# Patient Record
Sex: MALE | Race: WHITE | NOT HISPANIC OR LATINO | Employment: FULL TIME | URBAN - METROPOLITAN AREA
[De-identification: names, ages, dates, MRNs, and addresses within clinical notes are randomized per-mention and may not be internally consistent; named-entity substitution may affect disease eponyms.]

---

## 2018-01-17 ENCOUNTER — OFFICE VISIT (OUTPATIENT)
Dept: FAMILY MEDICINE CLINIC | Facility: CLINIC | Age: 58
End: 2018-01-17

## 2018-01-17 VITALS
SYSTOLIC BLOOD PRESSURE: 132 MMHG | BODY MASS INDEX: 29.47 KG/M2 | WEIGHT: 199 LBS | OXYGEN SATURATION: 98 % | TEMPERATURE: 97.8 F | HEIGHT: 69 IN | DIASTOLIC BLOOD PRESSURE: 62 MMHG | HEART RATE: 67 BPM

## 2018-01-17 DIAGNOSIS — R68.89 FLU-LIKE SYMPTOMS: Primary | ICD-10-CM

## 2018-01-17 DIAGNOSIS — J01.00 ACUTE MAXILLARY SINUSITIS, RECURRENCE NOT SPECIFIED: ICD-10-CM

## 2018-01-17 LAB
EXPIRATION DATE: NORMAL
FLUAV AG NPH QL: NORMAL
FLUBV AG NPH QL: NORMAL
INTERNAL CONTROL: NORMAL
Lab: NORMAL

## 2018-01-17 PROCEDURE — 87804 INFLUENZA ASSAY W/OPTIC: CPT | Performed by: FAMILY MEDICINE

## 2018-01-17 PROCEDURE — 99213 OFFICE O/P EST LOW 20 MIN: CPT | Performed by: FAMILY MEDICINE

## 2018-01-17 RX ORDER — AMOXICILLIN AND CLAVULANATE POTASSIUM 875; 125 MG/1; MG/1
1 TABLET, FILM COATED ORAL 2 TIMES DAILY
Qty: 20 TABLET | Refills: 0 | Status: SHIPPED | OUTPATIENT
Start: 2018-01-17 | End: 2018-01-27

## 2018-01-17 RX ORDER — FLUTICASONE PROPIONATE 50 MCG
SPRAY, SUSPENSION (ML) NASAL
COMMUNITY
Start: 2018-01-05 | End: 2018-11-02

## 2018-01-17 NOTE — PROGRESS NOTES
"Subjective   Saad Cleary is a 57 y.o. male.     Flu Symptoms   This is a new problem. The current episode started in the past 7 days (Started on Monday). Associated symptoms include chills, congestion (ears), coughing, a fever and myalgias. Pertinent negatives include no chest pain.        The following portions of the patient's history were reviewed and updated as appropriate: allergies, current medications, past social history and problem list.    Review of Systems   Constitutional: Positive for chills and fever.   HENT: Positive for congestion (ears), postnasal drip, rhinorrhea and sinus pressure.    Respiratory: Positive for cough. Negative for shortness of breath.    Cardiovascular: Negative for chest pain.   Musculoskeletal: Positive for myalgias.       Objective   /62  Pulse 67  Temp 97.8 °F (36.6 °C)  Ht 175.3 cm (69.02\")  Wt 90.3 kg (199 lb)  SpO2 98%  BMI 29.37 kg/m2  Physical Exam   Constitutional: He appears well-developed and well-nourished.   HENT:   Right Ear: External ear normal.   Left Ear: External ear normal.   Nose: Right sinus exhibits maxillary sinus tenderness. Left sinus exhibits maxillary sinus tenderness.   Mouth/Throat: Oropharynx is clear and moist.   Cardiovascular: Normal rate and regular rhythm.    Pulmonary/Chest: Effort normal and breath sounds normal.   Nursing note and vitals reviewed.      Assessment/Plan   Problem List Items Addressed This Visit     None      Visit Diagnoses     Flu-like symptoms    -  Primary    Relevant Orders    POCT Influenza A/B    Acute maxillary sinusitis, recurrence not specified                  Drink plenty fluids.    Use OTC Afrin nasal spray. Place 3 sprays in each nostril twice a day for no more than 4 days. Follow 5 minutes after with OTC saline nasal spray.    Rx for Augmentin 875 mg twice a day #20+0.    Follow up as needed.              Scribed for Dr León Weiss by Kristy Hernandez CMA.          I, León Weiss MD, personally " performed the services described in this documentation, as scribed by Kristy Hernandez in my presence, and is both accurate and complete.

## 2018-11-02 ENCOUNTER — OFFICE VISIT (OUTPATIENT)
Dept: FAMILY MEDICINE CLINIC | Facility: CLINIC | Age: 58
End: 2018-11-02

## 2018-11-02 VITALS
WEIGHT: 196 LBS | TEMPERATURE: 97.4 F | OXYGEN SATURATION: 98 % | HEIGHT: 69 IN | DIASTOLIC BLOOD PRESSURE: 76 MMHG | HEART RATE: 70 BPM | BODY MASS INDEX: 29.03 KG/M2 | SYSTOLIC BLOOD PRESSURE: 128 MMHG

## 2018-11-02 DIAGNOSIS — J06.9 ACUTE URI: Primary | ICD-10-CM

## 2018-11-02 PROCEDURE — 99213 OFFICE O/P EST LOW 20 MIN: CPT | Performed by: FAMILY MEDICINE

## 2018-11-02 RX ORDER — AZITHROMYCIN 250 MG/1
TABLET, FILM COATED ORAL
Qty: 6 TABLET | Refills: 0 | Status: SHIPPED | OUTPATIENT
Start: 2018-11-02 | End: 2019-02-11

## 2018-11-02 NOTE — PROGRESS NOTES
Subjective   Saad Cleary is a 58 y.o. male.     History of Present Illness   Head congestion, drainage three days.   Right hand in suspension device for ruptured tendon with surgery four months ago, minimal rehab and not using hand or wrist. Took Stahist with some relief.  The following portions of the patient's history were reviewed and updated as appropriate: allergies, current medications, past family history, past medical history, past social history, past surgical history and problem list.    Review of Systems   Constitutional: Negative for chills and fever.   HENT: Positive for congestion, ear pain and sore throat.    Respiratory: Negative.    Cardiovascular: Negative.        Objective   Physical Exam   Constitutional: He appears well-developed.   HENT:   Right Ear: Tympanic membrane is injected.   Left Ear: External ear normal.   Neck: Neck supple.   Pulmonary/Chest: Breath sounds normal.   Lymphadenopathy:     He has no cervical adenopathy.   Vitals reviewed.        Assessment/Plan   Saad was seen today for uri.    Diagnoses and all orders for this visit:    Acute URI  -     azithromycin (ZITHROMAX) 250 MG tablet; Take 2 tablets the first day, then 1 tablet daily on days 2 through 5.  -     Chlorcyclizine-Pseudoephed 25-60 MG tablet; Take 1 tablet by mouth 2 (Two) Times a Day As Needed (congestion).

## 2019-02-11 ENCOUNTER — OFFICE VISIT (OUTPATIENT)
Dept: FAMILY MEDICINE CLINIC | Facility: CLINIC | Age: 59
End: 2019-02-11

## 2019-02-11 VITALS
OXYGEN SATURATION: 98 % | DIASTOLIC BLOOD PRESSURE: 80 MMHG | RESPIRATION RATE: 18 BRPM | SYSTOLIC BLOOD PRESSURE: 142 MMHG | WEIGHT: 197.25 LBS | HEART RATE: 62 BPM | HEIGHT: 69 IN | TEMPERATURE: 97.8 F | BODY MASS INDEX: 29.21 KG/M2

## 2019-02-11 DIAGNOSIS — J02.9 PHARYNGITIS, UNSPECIFIED ETIOLOGY: Primary | ICD-10-CM

## 2019-02-11 DIAGNOSIS — J02.9 SORE THROAT: ICD-10-CM

## 2019-02-11 LAB
EXPIRATION DATE: NORMAL
EXPIRATION DATE: NORMAL
FLUAV AG NPH QL: NEGATIVE
FLUBV AG NPH QL: NEGATIVE
INTERNAL CONTROL: NORMAL
INTERNAL CONTROL: NORMAL
Lab: NORMAL
Lab: NORMAL
S PYO AG THROAT QL: NEGATIVE

## 2019-02-11 PROCEDURE — 87804 INFLUENZA ASSAY W/OPTIC: CPT | Performed by: NURSE PRACTITIONER

## 2019-02-11 PROCEDURE — 87880 STREP A ASSAY W/OPTIC: CPT | Performed by: NURSE PRACTITIONER

## 2019-02-11 PROCEDURE — 99213 OFFICE O/P EST LOW 20 MIN: CPT | Performed by: NURSE PRACTITIONER

## 2019-02-11 RX ORDER — AMOXICILLIN AND CLAVULANATE POTASSIUM 875; 125 MG/1; MG/1
1 TABLET, FILM COATED ORAL 2 TIMES DAILY
Qty: 20 TABLET | Refills: 0 | Status: SHIPPED | OUTPATIENT
Start: 2019-02-11 | End: 2022-08-10

## 2019-02-11 NOTE — PROGRESS NOTES
Subjective   Saad Cleary is a 58 y.o. male.     History of Present Illness Complains of 2 days of nasal congestion, headache, sore throat. No cough, fever, GI distress. No chest pain, sob, wheezing, nausea or visual changes. No treatment.  He did not have a flu vacc. No myalgia.   Daughter with same symptoms.    The following portions of the patient's history were reviewed and updated as appropriate: allergies, current medications, past family history, past medical history, past social history, past surgical history and problem list.    Review of Systems   Constitutional: Negative for appetite change, fever and unexpected weight loss.   HENT: Positive for sore throat. Negative for nosebleeds and trouble swallowing.    Eyes: Negative for visual disturbance.   Respiratory: Negative for cough, shortness of breath and wheezing.    Cardiovascular: Negative for chest pain, palpitations and leg swelling.   Gastrointestinal: Negative for abdominal pain, blood in stool, constipation, diarrhea, nausea and vomiting.   Endocrine: Negative for polydipsia, polyphagia and polyuria.   Genitourinary: Negative for urinary incontinence, dysuria, frequency and hematuria.   Musculoskeletal: Negative for arthralgias, gait problem, joint swelling and myalgias.   Skin: Negative for rash.   Neurological: Positive for headache. Negative for dizziness, seizures, syncope and numbness.   Hematological: Negative for adenopathy. Does not bruise/bleed easily.   Psychiatric/Behavioral: Negative for sleep disturbance and depressed mood. The patient is not nervous/anxious.        Objective   Physical Exam   Constitutional: He is oriented to person, place, and time. He appears well-developed and well-nourished. No distress.   HENT:   Head: Normocephalic and atraumatic.   Right Ear: Tympanic membrane and external ear normal.   Left Ear: Tympanic membrane and external ear normal.   Nose: Nose normal.   Mouth/Throat: Oropharyngeal exudate, posterior  oropharyngeal edema and posterior oropharyngeal erythema present.   Eyes: Conjunctivae are normal. Pupils are equal, round, and reactive to light. Right eye exhibits no discharge. Left eye exhibits no discharge. No scleral icterus.   Neck: Neck supple. No tracheal deviation present. No thyromegaly present.   Cardiovascular: Normal rate, regular rhythm and normal heart sounds. Exam reveals no gallop and no friction rub.   No murmur heard.  Pulmonary/Chest: Effort normal and breath sounds normal. No respiratory distress. He has no wheezes.   Abdominal: Soft. Bowel sounds are normal. He exhibits no distension and no mass. There is no tenderness.   Musculoskeletal: He exhibits no edema or deformity.   Lymphadenopathy:     He has no cervical adenopathy.   Neurological: He is alert and oriented to person, place, and time. Coordination normal.   Skin: Skin is warm and dry. Capillary refill takes less than 2 seconds. No rash noted. No erythema.   Psychiatric: He has a normal mood and affect. His speech is normal and behavior is normal. Judgment and thought content normal.   Nursing note and vitals reviewed.        Assessment/Plan   Saad was seen today for headache and sore throat.    Diagnoses and all orders for this visit:    Pharyngitis, unspecified etiology  -     amoxicillin-clavulanate (AUGMENTIN) 875-125 MG per tablet; Take 1 tablet by mouth 2 (Two) Times a Day.    Sore throat  -     POC Influenza A / B  -     POC Rapid Strep A      Finish all of meds. Soft foods. New toothbrush at the end of treatment. Follow up symptoms persist or worsen.

## 2021-07-06 ENCOUNTER — APPOINTMENT (OUTPATIENT)
Dept: GENERAL RADIOLOGY | Facility: HOSPITAL | Age: 61
End: 2021-07-06

## 2021-07-06 ENCOUNTER — APPOINTMENT (OUTPATIENT)
Dept: CT IMAGING | Facility: HOSPITAL | Age: 61
End: 2021-07-06

## 2021-07-06 ENCOUNTER — HOSPITAL ENCOUNTER (EMERGENCY)
Facility: HOSPITAL | Age: 61
Discharge: HOME | End: 2021-07-07
Attending: EMERGENCY MEDICINE | Admitting: EMERGENCY MEDICINE

## 2021-07-06 VITALS
HEART RATE: 64 BPM | SYSTOLIC BLOOD PRESSURE: 183 MMHG | WEIGHT: 190 LBS | OXYGEN SATURATION: 95 % | TEMPERATURE: 97.7 F | HEIGHT: 69 IN | BODY MASS INDEX: 28.14 KG/M2 | RESPIRATION RATE: 16 BRPM | DIASTOLIC BLOOD PRESSURE: 100 MMHG

## 2021-07-06 DIAGNOSIS — S42.001A CLOSED DISPLACED FRACTURE OF RIGHT CLAVICLE, UNSPECIFIED PART OF CLAVICLE, INITIAL ENCOUNTER: ICD-10-CM

## 2021-07-06 DIAGNOSIS — S20.229A CONTUSION OF BACK, UNSPECIFIED LATERALITY, INITIAL ENCOUNTER: ICD-10-CM

## 2021-07-06 DIAGNOSIS — S09.90XA INJURY OF HEAD, INITIAL ENCOUNTER: Primary | ICD-10-CM

## 2021-07-06 DIAGNOSIS — S60.032A CONTUSION OF LEFT MIDDLE FINGER WITHOUT DAMAGE TO NAIL, INITIAL ENCOUNTER: ICD-10-CM

## 2021-07-06 DIAGNOSIS — M50.30 DDD (DEGENERATIVE DISC DISEASE), CERVICAL: ICD-10-CM

## 2021-07-06 DIAGNOSIS — S22.31XA FRACTURE OF ONE RIB, RIGHT SIDE, INITIAL ENCOUNTER FOR CLOSED FRACTURE: ICD-10-CM

## 2021-07-06 LAB
HOLD SPECIMEN: NORMAL
HOLD SPECIMEN: NORMAL
WHOLE BLOOD HOLD SPECIMEN: NORMAL

## 2021-07-06 PROCEDURE — 73070 X-RAY EXAM OF ELBOW: CPT

## 2021-07-06 PROCEDURE — 73140 X-RAY EXAM OF FINGER(S): CPT

## 2021-07-06 PROCEDURE — 73030 X-RAY EXAM OF SHOULDER: CPT

## 2021-07-06 PROCEDURE — 72128 CT CHEST SPINE W/O DYE: CPT

## 2021-07-06 PROCEDURE — 99284 EMERGENCY DEPT VISIT MOD MDM: CPT

## 2021-07-06 PROCEDURE — 71250 CT THORAX DX C-: CPT

## 2021-07-06 PROCEDURE — 72125 CT NECK SPINE W/O DYE: CPT

## 2021-07-06 PROCEDURE — 70450 CT HEAD/BRAIN W/O DYE: CPT

## 2021-07-06 RX ORDER — SODIUM CHLORIDE 0.9 % (FLUSH) 0.9 %
10 SYRINGE (ML) INJECTION AS NEEDED
Status: DISCONTINUED | OUTPATIENT
Start: 2021-07-06 | End: 2021-07-07 | Stop reason: HOSPADM

## 2021-07-06 RX ORDER — HYDROCODONE BITARTRATE AND ACETAMINOPHEN 5; 325 MG/1; MG/1
1 TABLET ORAL EVERY 6 HOURS PRN
Qty: 12 TABLET | Refills: 0 | Status: SHIPPED | OUTPATIENT
Start: 2021-07-06 | End: 2022-08-10

## 2021-07-06 RX ORDER — HYDROCODONE BITARTRATE AND ACETAMINOPHEN 5; 325 MG/1; MG/1
1 TABLET ORAL ONCE
Status: COMPLETED | OUTPATIENT
Start: 2021-07-06 | End: 2021-07-06

## 2021-07-06 RX ADMIN — HYDROCODONE BITARTRATE AND ACETAMINOPHEN 1 TABLET: 5; 325 TABLET ORAL at 23:24

## 2021-07-07 ENCOUNTER — NURSE TRIAGE (OUTPATIENT)
Dept: CALL CENTER | Facility: HOSPITAL | Age: 61
End: 2021-07-07

## 2021-07-07 LAB — HOLD SPECIMEN: NORMAL

## 2021-07-07 PROCEDURE — 63710000001 ONDANSETRON ODT 4 MG TABLET DISPERSIBLE: Performed by: NURSE PRACTITIONER

## 2021-07-07 RX ORDER — ONDANSETRON 4 MG/1
4 TABLET, ORALLY DISINTEGRATING ORAL EVERY 4 HOURS
Qty: 12 TABLET | Refills: 0 | Status: SHIPPED | OUTPATIENT
Start: 2021-07-07 | End: 2022-08-10

## 2021-07-07 RX ORDER — ONDANSETRON 4 MG/1
4 TABLET, ORALLY DISINTEGRATING ORAL ONCE
Status: COMPLETED | OUTPATIENT
Start: 2021-07-07 | End: 2021-07-07

## 2021-07-07 RX ADMIN — ONDANSETRON 4 MG: 4 TABLET, ORALLY DISINTEGRATING ORAL at 00:14

## 2021-07-07 NOTE — TELEPHONE ENCOUNTER
Was seen in ER last night and was prescribed Norco and Zofran and on AVS said continue taking augmentin, he is not taking it and has not since 2019, told her was on med list, if was old medication to disregard and ask Ortho if needs to be on an antibiotic,  today when pt. wll be seen.     Reason for Disposition  • Caller has medicine question only, adult not sick, AND triager answers question    Additional Information  • Negative: [1] Intentional drug overdose AND [2] suicidal thoughts or ideas  • Negative: Drug overdose and triager unable to answer question  • Negative: Caller requesting information unrelated to medicine  • Negative: Caller requesting information about COVID-19 Vaccine  • Negative: Caller requesting information about Emergency Contraception  • Negative: Caller requesting information about Combined Birth Control Pills  • Negative: Caller requesting information about Progestin Birth Control Pills  • Negative: Caller requesting information about Post-Op pain or medicines  • Negative: Caller requesting a prescription antibiotic (such as Penicillin) for Strep throat and has a positive culture result  • Negative: Caller requesting a prescription anti-viral med (such as Tamiflu) and has influenza (flu)  symptoms  • Negative: Immunization reaction suspected  • Negative: Rash while taking a medicine or within 3 days of stopping it  • Negative: [1] Asthma and [2] having symptoms of asthma (cough, wheezing, etc.)  • Negative: [1] Symptom of illness (e.g., headache, abdominal pain, earache, vomiting) AND [2] more than mild  • Negative: Breastfeeding questions about mother's medicines and diet  • Negative: MORE THAN A DOUBLE DOSE of a prescription or over-the-counter (OTC) drug  • Negative: [1] DOUBLE DOSE (an extra dose or lesser amount) of prescription drug AND [2] any symptoms (e.g., dizziness, nausea, pain, sleepiness)  • Negative: [1] DOUBLE DOSE (an extra dose or lesser amount) of over-the-counter (OTC)  drug AND [2] any symptoms (e.g., dizziness, nausea, pain, sleepiness)  • Negative: Took another person's prescription drug  • Negative: [1] DOUBLE DOSE (an extra dose or lesser amount) of prescription drug AND [2] NO symptoms (Exception: a double dose of antibiotics)  • Negative: Diabetes drug error or overdose (e.g., took wrong type of insulin or took extra dose)  • Negative: [1] Prescription refill request for ESSENTIAL medicine (i.e., likelihood of harm to patient if not taken) AND [2] triager unable to refill per department policy  • Negative: [1] Prescription not at pharmacy AND [2] was prescribed by PCP recently (Exception: triager has access to EMR and prescription is recorded there. Go to Home Care and confirm for pharmacy.)  • Negative: [1] Pharmacy calling with prescription question AND [2] triager unable to answer question  • Negative: [1] Caller has URGENT medicine question about med that PCP or specialist prescribed AND [2] triager unable to answer question  • Negative: Medicine patch causing local rash or itching  • Negative: [1] Caller has medicine question about med NOT prescribed by PCP AND [2] triager unable to answer question (e.g., compatibility with other med, storage)  • Negative: Prescription request for new medicine (not a refill)  • Negative: Prescription refill request for a CONTROLLED substance (e.g., narcotics, ADHD medicines)  • Negative: [1] Prescription refill request for NON-ESSENTIAL medicine (i.e., no harm to patient if med not taken) AND [2] triager unable to refill per department policy  • Negative: [1] Caller has NON-URGENT medicine question about med that PCP prescribed AND [2] triager unable to answer question  • Negative: Caller wants to use a complementary or alternative medicine  • Negative: [1] Prescription prescribed recently is not at pharmacy AND [2] triager has access to patient's EMR AND [3] prescription is recorded in the EMR  • Negative: [1] DOUBLE DOSE (an extra  "dose or lesser amount) of over-the-counter (OTC) drug AND [2] NO symptoms  • Negative: [1] DOUBLE DOSE (an extra dose or lesser amount) of antibiotic drug AND [2] NO symptoms  • Negative: Caller has medicine question, adult has minor symptoms, caller declines triage, AND triager answers question  • Negative: Caller requesting information about medicine during pregnancy; adult is not ill AND triager answers question  • Negative: Caller requesting information about medicine use with breastfeeding; neither adult nor infant is ill, triager answers question  • Negative: [1] Caller requesting a prescription renewal (no refills left), no triage required, AND [2] triager able to renew prescription per department policy  • Negative: Patient has refills remaining on their prescription  • Negative: Pharmacy calling with prescription question and triager answers question    Answer Assessment - Initial Assessment Questions  1. NAME of MEDICATION: \"What medicine are you calling about?\"      Augmentin  2. QUESTION: \"What is your question?\" (e.g., medication refill, side effect)      He is not on this says continued on AVS  3. PRESCRIBING HCP: \"Who prescribed it?\" Reason: if prescribed by specialist, call should be referred to that group.      Old prescription  4. SYMPTOMS: \"Do you have any symptoms?\"      no  5. SEVERITY: If symptoms are present, ask \"Are they mild, moderate or severe?\"      no  6. PREGNANCY:  \"Is there any chance that you are pregnant?\" \"When was your last menstrual period?\"      no    Protocols used: MEDICATION QUESTION CALL-ADULT-      "

## 2022-08-10 ENCOUNTER — OFFICE VISIT (OUTPATIENT)
Dept: FAMILY MEDICINE CLINIC | Facility: CLINIC | Age: 62
End: 2022-08-10

## 2022-08-10 VITALS
SYSTOLIC BLOOD PRESSURE: 124 MMHG | BODY MASS INDEX: 29.03 KG/M2 | TEMPERATURE: 97.1 F | WEIGHT: 196 LBS | HEIGHT: 69 IN | OXYGEN SATURATION: 98 % | HEART RATE: 72 BPM | DIASTOLIC BLOOD PRESSURE: 80 MMHG

## 2022-08-10 DIAGNOSIS — U07.1 COVID-19 VIRUS INFECTION: Primary | ICD-10-CM

## 2022-08-10 LAB
EXPIRATION DATE: ABNORMAL
EXPIRATION DATE: NORMAL
FLUAV AG UPPER RESP QL IA.RAPID: NOT DETECTED
FLUBV AG UPPER RESP QL IA.RAPID: NOT DETECTED
INTERNAL CONTROL: ABNORMAL
INTERNAL CONTROL: NORMAL
Lab: ABNORMAL
Lab: NORMAL
S PYO AG THROAT QL: NEGATIVE
SARS-COV-2 AG UPPER RESP QL IA.RAPID: DETECTED

## 2022-08-10 PROCEDURE — 99203 OFFICE O/P NEW LOW 30 MIN: CPT | Performed by: PHYSICIAN ASSISTANT

## 2022-08-10 PROCEDURE — 87428 SARSCOV & INF VIR A&B AG IA: CPT | Performed by: PHYSICIAN ASSISTANT

## 2022-08-10 PROCEDURE — 87880 STREP A ASSAY W/OPTIC: CPT | Performed by: PHYSICIAN ASSISTANT

## 2022-08-10 RX ORDER — BROMPHENIRAMINE MALEATE, PSEUDOEPHEDRINE HYDROCHLORIDE, AND DEXTROMETHORPHAN HYDROBROMIDE 2; 30; 10 MG/5ML; MG/5ML; MG/5ML
5 SYRUP ORAL 4 TIMES DAILY PRN
Qty: 118 ML | Refills: 0 | Status: SHIPPED | OUTPATIENT
Start: 2022-08-10

## 2022-08-10 NOTE — PATIENT INSTRUCTIONS
-Isolate for 5 days from the onset of your symptoms. You may end your isolation after 5 days as long as you have been without a fever for more than 24 hours without the use of fever reducing medications such as Tylenol and ibuprofen and symptoms are improving.  If symptoms have not improved or you still have a fever after 5 days, you must isolate for 10 days from the onset of your symptoms.  -If you are short of breath, I recommend reassessment. If this is severe, please go to the emergency room.  -I recommend obtaining a pulse oximeter and monitoring your oxygen saturations at home.  If oxygen saturations are persistently less than 94%, I would also recommend going to the ER  -I recommend vitamin C 500 mg, zinc 50 mg, and vitamin D3 50 mcg daily

## 2022-08-10 NOTE — PROGRESS NOTES
Chief Complaint   Patient presents with   • Establish Care   • Sore Throat     X 2 days   • nasal drainage x 2 days       HPI     Saad Cleary is a pleasant 61 y.o. male with a PMH of colon polyps who presents for initial visit.     His previous PCP retired around 1 year ago.   Patient c/o sore throat starting 3 days ago. He has more drainage, cough, headache, body aches, and chest congestion now. He may have had a fever one day. Denies current fever, loss of taste/smell, shortness of breath. He did a PCR COVID test yesterday at his employer's clinic ([facility]) but this is still pending. He tool Tylenol about 1 hour ago.      Past Medical History:   Diagnosis Date   • Acute pharyngitis    • Acute upper respiratory infection    • Colon polyps    • Testicular mass        Past Surgical History:   Procedure Laterality Date   • ELBOW ARTHROSCOPY     • FOOT TENDON SURGERY Left 07/2021   • HAND TENDON SURGERY  07/2020   • SHOULDER SURGERY         Family History   Problem Relation Age of Onset   • Diabetes Mother    • Cancer Mother    • Breast cancer Mother    • Heart attack Mother    • Lung cancer Father    • Breast cancer Sister        Social History     Socioeconomic History   • Marital status:    Tobacco Use   • Smoking status: Never Smoker   • Smokeless tobacco: Never Used   Vaping Use   • Vaping Use: Never used   Substance and Sexual Activity   • Alcohol use: No   • Drug use: No   • Sexual activity: Defer       Allergies   Allergen Reactions   • Codeine        ROS    Review of Systems   Constitutional: Negative for chills and fever.   HENT: Positive for congestion and sore throat.    Respiratory: Positive for cough. Negative for shortness of breath and wheezing.    Cardiovascular: Negative for chest pain.   Musculoskeletal: Positive for myalgias.   Neurological: Positive for headache.       Vitals:    08/10/22 0829   BP: 124/80   Pulse: 72   Temp: 97.1 °F (36.2 °C)   SpO2: 98%     Body mass index is 28.94  kg/m².      Current Outpatient Medications:   •  brompheniramine-pseudoephedrine-DM 30-2-10 MG/5ML syrup, Take 5 mL by mouth 4 (Four) Times a Day As Needed for Congestion or Cough., Disp: 118 mL, Rfl: 0    PE    Physical Exam  Vitals reviewed.   Constitutional:       General: He is not in acute distress.     Appearance: He is well-developed.   HENT:      Head: Normocephalic and atraumatic.   Eyes:      Conjunctiva/sclera: Conjunctivae normal.   Cardiovascular:      Rate and Rhythm: Normal rate and regular rhythm.      Heart sounds: Normal heart sounds. No murmur heard.  Pulmonary:      Effort: Pulmonary effort is normal.      Breath sounds: Normal breath sounds.   Musculoskeletal:      Cervical back: Normal range of motion.   Skin:     General: Skin is warm and dry.   Neurological:      Mental Status: He is alert.      Gait: Gait normal.   Psychiatric:         Speech: Speech normal.         Behavior: Behavior normal.          A/P    Problem List Items Addressed This Visit    None     Visit Diagnoses     COVID-19 virus infection    -  Primary  -COVID test is positive today. Rapid strep and influenza tests are negative  -Currently mild symptoms, afebrile, lungs CTA  -Discussed symptomatic and supportive treatment. Patient declines Paxlovid prescription after discussion regarding risks and benefits  -Rx cough syrup to help with postnasal drainage, congestion, sore throat, and cough  -We reviewed isolation recommendations and contingencies for worsening symptoms  -Return to clinic in 6 months for physical or sooner if needed    Relevant Orders    POCT rapid strep A (Completed)    POCT SARS-CoV-2 Antigen MADHU (Completed)          Plan of care was reviewed with patient at the conclusion of today's visit. Education was provided regarding diagnoses, management, prescribed or recommended OTC products, and the importance of compliance with follow-up appointments. The patient was counseled regarding the risks, benefits, and  possible side-effects of treatment. I advised the patient to keep me informed of any acute changes in their status including new, worsening, or persistent symptoms. Patient expresses understanding and agreement with the management plan.        AUNG Sibley

## 2023-07-26 NOTE — PROGRESS NOTES
"[facility]    Chief Complaint  Abnormal ECG and Establish Care    Subjective    History of Present Illness {CC  Problem List  Visit  Diagnosis   Encounters  Notes  Medications  Labs  Result Review Imaging  Media :23}     Saad Cleary is a 62 y.o. male with HTN who presents today for evaluation of abnormal EKG at the request of [facility].    Patient had recent workplace physical and had an EKG which showed a first-degree AV block, approximately 294 ms. Works at [employer]. He is asymptomatic. He exercises regularly, does the elliptical or stationary bike for an hour. Used to run prior to his knee pain. Notes some mild lower extremity edema, but improved after he was started on lisinopril. He denies shortness of breath. Rare orthostatic lightheadedness since starting lisinopril. No syncope       Cardiac risks: HTN, age    Objective     Vital Signs:   Vitals:    07/27/23 1531 07/27/23 1534 07/27/23 1535   BP: 128/73 132/82 119/73   BP Location: Right arm Left arm Left arm   Patient Position: Sitting Standing Sitting   Cuff Size: Adult Adult Adult   Pulse: 59 59 57   Resp:   16   Temp:   97.8 °F (36.6 °C)   TempSrc:   Temporal   SpO2: 96% 95% 96%   Weight:   89.6 kg (197 lb 8 oz)   Height:   175.3 cm (69\")     Body mass index is 29.17 kg/m².  Physical Exam  Vitals reviewed.   Constitutional:       Appearance: Normal appearance.   HENT:      Head: Normocephalic.   Neck:      Vascular: No carotid bruit.   Cardiovascular:      Rate and Rhythm: Regular rhythm. Bradycardia present.      Pulses: Normal pulses.      Heart sounds: Normal heart sounds, S1 normal and S2 normal. No murmur heard.  Pulmonary:      Effort: Pulmonary effort is normal. No respiratory distress.      Breath sounds: Normal breath sounds.   Chest:      Chest wall: No tenderness.   Abdominal:      General: Abdomen is flat.      Palpations: Abdomen is soft.   Musculoskeletal:      Cervical back: " Neck supple.      Right lower le+ No edema.      Left lower le+ No edema.   Skin:     General: Skin is warm and dry.   Neurological:      General: No focal deficit present.      Mental Status: He is alert and oriented to person, place, and time. Mental status is at baseline.   Psychiatric:         Mood and Affect: Mood normal.         Behavior: Behavior normal.         Thought Content: Thought content normal.            Result Review  Data Reviewed:{ Labs  Result Review  Imaging  Med Tab  Media :23}   Reviewed records from [facility]  SCANNED EKG (2023)          Assessment and Plan {CC Problem List  Visit Diagnosis  ROS  Review (Popup)  Health Maintenance  Quality  BestPractice  Medications  SmartSets  SnapShot Encounters  Media :23}   1. Abnormal EKG  - Voltage criteria for possible LVH. Will do echo  - ECG 12 Lead; Future  - Adult Transthoracic Echo Complete W/ Cont if Necessary Per Protocol; Future  - TSH Rfx On Abnormal To Free T4; Future    2. First degree AV block  - Asymptomatic  - Adult Transthoracic Echo Complete W/ Cont if Necessary Per Protocol; Future    3. Essential hypertension  - Newly diagnosed. Reviewed home BPs and most SBPs less than 120  -Continue lisinopril.  He has not had repeat labs since he started lisinopril.  We will check today  - Adult Transthoracic Echo Complete W/ Cont if Necessary Per Protocol; Future  - Basic Metabolic Panel; Future  - TSH Rfx On Abnormal To Free T4; Future    4. Bilateral lower extremity edema  - Dependent edema  - Adult Transthoracic Echo Complete W/ Cont if Necessary Per Protocol; Future  - Basic Metabolic Panel; Future  - TSH Rfx On Abnormal To Free T4; Future        Follow Up {Instructions Charge Capture  Follow-up Communications :23}   Return if symptoms worsen or fail to improve.    Patient was given instructions and counseling regarding his condition or for health maintenance advice. Please see specific information  pulled into the AVS if appropriate.  Advised to call the [facility] with any questions, concerns, or worsening symptoms.

## 2023-07-27 ENCOUNTER — OFFICE VISIT (OUTPATIENT)
Dept: CARDIOLOGY | Facility: HOSPITAL | Age: 63
End: 2023-07-27
Payer: COMMERCIAL

## 2023-07-27 ENCOUNTER — HOSPITAL ENCOUNTER (OUTPATIENT)
Dept: CARDIOLOGY | Facility: HOSPITAL | Age: 63
Discharge: HOME | End: 2023-07-27
Payer: COMMERCIAL

## 2023-07-27 ENCOUNTER — LAB (OUTPATIENT)
Dept: LAB | Facility: HOSPITAL | Age: 63
End: 2023-07-27
Payer: COMMERCIAL

## 2023-07-27 VITALS
BODY MASS INDEX: 29.25 KG/M2 | WEIGHT: 197.5 LBS | HEART RATE: 57 BPM | RESPIRATION RATE: 16 BRPM | SYSTOLIC BLOOD PRESSURE: 119 MMHG | TEMPERATURE: 97.8 F | DIASTOLIC BLOOD PRESSURE: 73 MMHG | HEIGHT: 69 IN | OXYGEN SATURATION: 96 %

## 2023-07-27 DIAGNOSIS — R94.31 ABNORMAL EKG: Primary | ICD-10-CM

## 2023-07-27 DIAGNOSIS — R60.0 BILATERAL LOWER EXTREMITY EDEMA: ICD-10-CM

## 2023-07-27 DIAGNOSIS — R94.31 ABNORMAL EKG: ICD-10-CM

## 2023-07-27 DIAGNOSIS — I10 ESSENTIAL HYPERTENSION: ICD-10-CM

## 2023-07-27 DIAGNOSIS — I44.0 FIRST DEGREE AV BLOCK: ICD-10-CM

## 2023-07-27 LAB
ANION GAP SERPL CALCULATED.3IONS-SCNC: 13 MMOL/L (ref 5–15)
BUN SERPL-MCNC: 19 MG/DL (ref 8–23)
BUN/CREAT SERPL: 17.9 (ref 7–25)
CALCIUM SPEC-SCNC: 9.2 MG/DL (ref 8.6–10.5)
CHLORIDE SERPL-SCNC: 105 MMOL/L (ref 98–107)
CO2 SERPL-SCNC: 24 MMOL/L (ref 22–29)
CREAT SERPL-MCNC: 1.06 MG/DL (ref 0.76–1.27)
EGFRCR SERPLBLD CKD-EPI 2021: 78.4 ML/MIN/1.73
GLUCOSE SERPL-MCNC: 96 MG/DL (ref 65–99)
POTASSIUM SERPL-SCNC: 4.5 MMOL/L (ref 3.5–5.2)
SODIUM SERPL-SCNC: 142 MMOL/L (ref 136–145)
TSH SERPL DL<=0.05 MIU/L-ACNC: 1.76 UIU/ML (ref 0.27–4.2)

## 2023-07-27 PROCEDURE — 80048 BASIC METABOLIC PNL TOTAL CA: CPT

## 2023-07-27 PROCEDURE — 93005 ELECTROCARDIOGRAM TRACING: CPT | Performed by: NURSE PRACTITIONER

## 2023-07-27 PROCEDURE — 84443 ASSAY THYROID STIM HORMONE: CPT

## 2023-07-27 PROCEDURE — 36415 COLL VENOUS BLD VENIPUNCTURE: CPT

## 2023-07-27 RX ORDER — LISINOPRIL 20 MG/1
20 TABLET ORAL DAILY
COMMUNITY
Start: 2023-06-16 | End: 2024-06-15

## 2023-07-28 LAB
QT INTERVAL: 444 MS
QTC INTERVAL: 424 MS

## 2023-08-04 LAB
QT INTERVAL: 444 MS
QTC INTERVAL: 424 MS

## 2023-09-05 ENCOUNTER — HOSPITAL ENCOUNTER (OUTPATIENT)
Dept: CARDIOLOGY | Facility: HOSPITAL | Age: 63
Discharge: HOME | End: 2023-09-05
Admitting: NURSE PRACTITIONER
Payer: COMMERCIAL

## 2023-09-05 VITALS
WEIGHT: 197 LBS | DIASTOLIC BLOOD PRESSURE: 84 MMHG | HEIGHT: 69 IN | BODY MASS INDEX: 29.18 KG/M2 | SYSTOLIC BLOOD PRESSURE: 136 MMHG

## 2023-09-05 DIAGNOSIS — R94.31 ABNORMAL EKG: ICD-10-CM

## 2023-09-05 DIAGNOSIS — R60.0 BILATERAL LOWER EXTREMITY EDEMA: ICD-10-CM

## 2023-09-05 DIAGNOSIS — I44.0 FIRST DEGREE AV BLOCK: ICD-10-CM

## 2023-09-05 DIAGNOSIS — I10 ESSENTIAL HYPERTENSION: ICD-10-CM

## 2023-09-05 PROCEDURE — 93306 TTE W/DOPPLER COMPLETE: CPT

## 2023-09-06 ENCOUNTER — TELEPHONE (OUTPATIENT)
Dept: CARDIOLOGY | Facility: HOSPITAL | Age: 63
End: 2023-09-06
Payer: COMMERCIAL

## 2023-09-06 LAB
BH CV ECHO MEAS - AO MAX PG: 8.5 MMHG
BH CV ECHO MEAS - AO MEAN PG: 5 MMHG
BH CV ECHO MEAS - AO ROOT DIAM: 3.4 CM
BH CV ECHO MEAS - AO V2 MAX: 145.5 CM/SEC
BH CV ECHO MEAS - AO V2 VTI: 32.6 CM
BH CV ECHO MEAS - AVA(I,D): 3 CM2
BH CV ECHO MEAS - EDV(CUBED): 42.9 ML
BH CV ECHO MEAS - EDV(MOD-SP2): 131 ML
BH CV ECHO MEAS - EDV(MOD-SP4): 122 ML
BH CV ECHO MEAS - EF(MOD-BP): 61.4 %
BH CV ECHO MEAS - EF(MOD-SP2): 64.3 %
BH CV ECHO MEAS - EF(MOD-SP4): 57 %
BH CV ECHO MEAS - ESV(CUBED): 12.2 ML
BH CV ECHO MEAS - ESV(MOD-SP2): 46.8 ML
BH CV ECHO MEAS - ESV(MOD-SP4): 52.4 ML
BH CV ECHO MEAS - FS: 34.3 %
BH CV ECHO MEAS - IVS/LVPW: 1 CM
BH CV ECHO MEAS - IVSD: 1.3 CM
BH CV ECHO MEAS - LA DIMENSION: 3.7 CM
BH CV ECHO MEAS - LAT PEAK E' VEL: 10.6 CM/SEC
BH CV ECHO MEAS - LV DIASTOLIC VOL/BSA (35-75): 59.4 CM2
BH CV ECHO MEAS - LV MASS(C)D: 153.8 GRAMS
BH CV ECHO MEAS - LV MAX PG: 9 MMHG
BH CV ECHO MEAS - LV MEAN PG: 4 MMHG
BH CV ECHO MEAS - LV SYSTOLIC VOL/BSA (12-30): 25.5 CM2
BH CV ECHO MEAS - LV V1 MAX: 150 CM/SEC
BH CV ECHO MEAS - LV V1 VTI: 31.3 CM
BH CV ECHO MEAS - LVIDD: 3.5 CM
BH CV ECHO MEAS - LVIDS: 2.3 CM
BH CV ECHO MEAS - LVOT AREA: 3.1 CM2
BH CV ECHO MEAS - LVOT DIAM: 2 CM
BH CV ECHO MEAS - LVPWD: 1.3 CM
BH CV ECHO MEAS - MED PEAK E' VEL: 6.5 CM/SEC
BH CV ECHO MEAS - MR MAX PG: 102.8 MMHG
BH CV ECHO MEAS - MR MAX VEL: 507 CM/SEC
BH CV ECHO MEAS - MV A MAX VEL: 114 CM/SEC
BH CV ECHO MEAS - MV DEC SLOPE: 320 CM/SEC2
BH CV ECHO MEAS - MV DEC TIME: 0.24 MSEC
BH CV ECHO MEAS - MV E MAX VEL: 88.6 CM/SEC
BH CV ECHO MEAS - MV E/A: 0.78
BH CV ECHO MEAS - MV MAX PG: 5.1 MMHG
BH CV ECHO MEAS - MV MEAN PG: 2 MMHG
BH CV ECHO MEAS - MV P1/2T: 101.6 MSEC
BH CV ECHO MEAS - MV V2 VTI: 46.4 CM
BH CV ECHO MEAS - MVA(P1/2T): 2.17 CM2
BH CV ECHO MEAS - MVA(VTI): 2.12 CM2
BH CV ECHO MEAS - PA ACC TIME: 0.16 SEC
BH CV ECHO MEAS - PA V2 MAX: 144 CM/SEC
BH CV ECHO MEAS - PI END-D VEL: 99.5 CM/SEC
BH CV ECHO MEAS - RAP SYSTOLE: 8 MMHG
BH CV ECHO MEAS - RVSP: 28 MMHG
BH CV ECHO MEAS - SI(MOD-SP2): 41 ML/M2
BH CV ECHO MEAS - SI(MOD-SP4): 33.9 ML/M2
BH CV ECHO MEAS - SV(LVOT): 98.3 ML
BH CV ECHO MEAS - SV(MOD-SP2): 84.2 ML
BH CV ECHO MEAS - SV(MOD-SP4): 69.6 ML
BH CV ECHO MEAS - TAPSE (>1.6): 2 CM
BH CV ECHO MEAS - TR MAX PG: 19.4 MMHG
BH CV ECHO MEAS - TR MAX VEL: 220 CM/SEC
BH CV ECHO MEASUREMENTS AVERAGE E/E' RATIO: 10.36
BH CV XLRA - RV BASE: 4.2 CM
BH CV XLRA - RV LENGTH: 7.9 CM
BH CV XLRA - RV MID: 3.4 CM
BH CV XLRA - TDI S': 12.2 CM/SEC
LEFT ATRIUM VOLUME INDEX: 33.1 ML/M2

## 2023-09-06 NOTE — TELEPHONE ENCOUNTER
Called patient with echo results. No significant abnormalities noted. Mild to moderate MR. Recommend that he have repeat echo in about 3 years. Keep annual physicals with PCP and continue good BP control. Advised to call if he has any new symptoms

## 2023-09-14 NOTE — ED PROVIDER NOTES
SARITHA SV. Subjective   Saad Cleary is a 60 yr old male presents to the emergency department following a bicycle accident.  Patient reports that his front wheel on his bicycle was wobbly.  It caused him to wrecked his bicycle and go over his handlebars.  Patient did hit his head.  He was wearing a helmet and the impact cracked the helmet.  Patient had a + LOC.  Patient denies any neck pain.  He complains of pain to his right shoulder and right elbow.  He has pain to his left index and middle finger.  Patient reports pain in his mid back.  Injury happened approximately 1 hour ago.  He denies any nausea, vomiting.  Patient denies any blurred vision, double vision.  Patient is not on any blood thinners.      History provided by:  Patient   used: No        Review of Systems   Constitutional: Negative for chills and fever.   Eyes: Negative for visual disturbance.   Respiratory: Negative for shortness of breath.    Cardiovascular: Positive for chest pain.   Gastrointestinal: Negative for nausea and vomiting.   Musculoskeletal: Positive for back pain. Negative for neck pain.        Left fingers, right shoulder, right elbow pain     Neurological: Negative for dizziness and weakness.   All other systems reviewed and are negative.      Past Medical History:   Diagnosis Date   • Acute pharyngitis    • Acute upper respiratory infection    • Testicular mass        Allergies   Allergen Reactions   • Codeine        Past Surgical History:   Procedure Laterality Date   • ELBOW ARTHROSCOPY     • SHOULDER SURGERY         Family History   Problem Relation Age of Onset   • Diabetes Mother    • Cancer Mother    • Breast cancer Mother    • Lung cancer Father    • Breast cancer Sister        Social History     Socioeconomic History   • Marital status:      Spouse name: Not on file   • Number of children: Not on file   • Years of education: Not on file   • Highest education level: Not on file   Tobacco Use   • Smoking  status: Never Smoker   • Smokeless tobacco: Never Used   Substance and Sexual Activity   • Alcohol use: No   • Drug use: No   • Sexual activity: Defer           Objective   Physical Exam  Vitals and nursing note reviewed.   Constitutional:       General: He is in acute distress.      Appearance: Normal appearance. He is well-developed. He is not ill-appearing or toxic-appearing.   HENT:      Head: Normocephalic and atraumatic.      Nose: Nose normal.      Mouth/Throat:      Mouth: Mucous membranes are moist.   Eyes:      General: Lids are normal.      Extraocular Movements: Extraocular movements intact.      Conjunctiva/sclera: Conjunctivae normal.      Pupils: Pupils are equal, round, and reactive to light.   Neck:      Trachea: Trachea normal.   Cardiovascular:      Rate and Rhythm: Normal rate and regular rhythm.      Pulses: Normal pulses.      Heart sounds: Normal heart sounds.   Pulmonary:      Effort: Pulmonary effort is normal. No respiratory distress.      Breath sounds: Normal breath sounds. No decreased breath sounds, wheezing, rhonchi or rales.   Abdominal:      General: Bowel sounds are normal.      Palpations: Abdomen is soft.      Tenderness: There is no abdominal tenderness.   Musculoskeletal:      Right shoulder: Tenderness present. Decreased range of motion. Normal pulse.      Right elbow: Decreased range of motion. Tenderness present.      Left hand: Tenderness (index, middle finger tenderness) present. Normal capillary refill. Normal pulse.      Cervical back: Normal, full passive range of motion without pain and normal range of motion.      Thoracic back: Tenderness present. Decreased range of motion.      Lumbar back: No tenderness. Normal range of motion.   Skin:     General: Skin is warm and dry.      Findings: No rash.   Neurological:      Mental Status: He is alert and oriented to person, place, and time.      Cranial Nerves: No cranial nerve deficit.   Psychiatric:         Speech: Speech  normal.         Behavior: Behavior normal. Behavior is cooperative.         Procedures           ED Course  ED Course as of Jul 08 1311   Tue Jul 06, 2021   4807 Results are discussed with patient and family at this time.  Patient will be discharged home.  Patient to take meds as ordered.  Patient to follow-up with Ortho.  Patient and family agree with treatment plan and verbalized understanding.    [KG]      ED Course User Index  [KG] Ayla Becerra, APRN           No results found for this or any previous visit (from the past 24 hour(s)).  Note: In addition to lab results from this visit, the labs listed above may include labs taken at another facility or during a different encounter within the last 24 hours. Please correlate lab times with ED admission and discharge times for further clarification of the services performed during this visit.    XR Finger 2+ View Left   Final Result   Negative left 3rd finger series.      Signer Name: Thang Carter MD    Signed: 7/6/2021 11:08 PM    Workstation Name: [facility]     Radiology [facility]      XR Shoulder 2+ View Right   Final Result   Clavicular fracture in the midshaft with mild comminution                .      Signer Name: Pinky Herman MD    Signed: 7/6/2021 11:08 PM    Workstation Name: [facility]     Radiology [facility]      XR Elbow 2 View Right   Final Result   No acute osseous abnormality.      Signer Name: Thang Carter MD    Signed: 7/6/2021 11:08 PM    Workstation Name: [facility]     Radiology [facility]      CT Head Without Contrast   Final Result   Negative head CT examination.      Signer Name: Thang Carter MD    Signed: 7/6/2021 10:43 PM    Workstation Name: [facility]     Radiology [facility]      CT Chest Without Contrast Diagnostic   Final Result   1.  Nondisplaced lateral right 4th rib fracture.   2.  No evidence of acute intrathoracic traumatic injury.  "The lungs are clear. Aorta and branch vessels are unremarkable.   3.  Comminuted right clavicle fracture.      Signer Name: Thang Carter MD    Signed: 7/6/2021 10:51 PM    Workstation Name: [facility]      CT Cervical Spine Without Contrast   Final Result   1.  No acute osseous abnormality involving the cervical spine. Multilevel degenerative changes as noted above.   2.  Partially visualized comminuted right mid clavicle fracture.      Signer Name: Thang Carter MD    Signed: 7/6/2021 10:46 PM    Workstation Name: [facility]      CT Thoracic Spine Without Contrast   Final Result   1. Negative CT imaging of the thoracic spine.   2. Right clavicle fracture.      Signer Name: Thang Carter MD    Signed: 7/6/2021 10:48 PM    Workstation Name: [facility]        Vitals:    07/06/21 2112   BP: (!) 183/100   BP Location: Left arm   Patient Position: Lying   Pulse: 64   Resp: 16   Temp: 97.7 °F (36.5 °C)   TempSrc: Oral   SpO2: 95%   Weight: 86.2 kg (190 lb)   Height: 175.3 cm (69\")     Medications   HYDROcodone-acetaminophen (NORCO) 5-325 MG per tablet 1 tablet (1 tablet Oral Given 7/6/21 5364)   ondansetron ODT (ZOFRAN-ODT) disintegrating tablet 4 mg (4 mg Oral Given 7/7/21 0014)     ECG/EMG Results (last 24 hours)     ** No results found for the last 24 hours. **        No orders to display                                       MDM    Final diagnoses:   Injury of head, initial encounter   Closed displaced fracture of right clavicle, unspecified part of clavicle, initial encounter   Fracture of one rib, right side, initial encounter for closed fracture   Contusion of left middle finger without damage to nail, initial encounter   DDD (degenerative disc disease), cervical   Contusion of back, unspecified laterality, initial encounter       ED Disposition  ED Disposition     ED " Disposition Condition Comment    Discharge Stable           No follow-up provider specified.       Medication List      No changes were made to your prescriptions during this visit.          Ayla Becerra, APRN  07/08/21 0005

## 2024-02-06 NOTE — DISCHARGE INSTRUCTIONS
Take meds as ordered.  Wear sling is applied.  Follow-up with Ortho.  Return to the ER as needed.   06-Feb-2024